# Patient Record
Sex: FEMALE
[De-identification: names, ages, dates, MRNs, and addresses within clinical notes are randomized per-mention and may not be internally consistent; named-entity substitution may affect disease eponyms.]

---

## 2023-06-28 ENCOUNTER — APPOINTMENT (OUTPATIENT)
Dept: UROLOGY | Facility: CLINIC | Age: 33
End: 2023-06-28
Payer: COMMERCIAL

## 2023-06-28 ENCOUNTER — TRANSCRIPTION ENCOUNTER (OUTPATIENT)
Age: 33
End: 2023-06-28

## 2023-06-28 VITALS
HEART RATE: 65 BPM | SYSTOLIC BLOOD PRESSURE: 105 MMHG | BODY MASS INDEX: 24.71 KG/M2 | TEMPERATURE: 97.5 F | WEIGHT: 163 LBS | OXYGEN SATURATION: 98 % | DIASTOLIC BLOOD PRESSURE: 69 MMHG | HEIGHT: 68 IN

## 2023-06-28 DIAGNOSIS — R39.9 UNSPECIFIED SYMPTOMS AND SIGNS INVOLVING THE GENITOURINARY SYSTEM: ICD-10-CM

## 2023-06-28 PROBLEM — Z00.00 ENCOUNTER FOR PREVENTIVE HEALTH EXAMINATION: Status: ACTIVE | Noted: 2023-06-28

## 2023-06-28 LAB
BILIRUB UR QL STRIP: NORMAL
CLARITY UR: CLEAR
COLLECTION METHOD: NORMAL
GLUCOSE UR-MCNC: NORMAL
HCG UR QL: 0.2 EU/DL
HGB UR QL STRIP.AUTO: NORMAL
KETONES UR-MCNC: NORMAL
LEUKOCYTE ESTERASE UR QL STRIP: NORMAL
NITRITE UR QL STRIP: NORMAL
PH UR STRIP: 5.5
PROT UR STRIP-MCNC: NORMAL
SP GR UR STRIP: 1.02

## 2023-06-28 PROCEDURE — 99204 OFFICE O/P NEW MOD 45 MIN: CPT

## 2023-06-28 PROCEDURE — 81003 URINALYSIS AUTO W/O SCOPE: CPT | Mod: QW

## 2023-06-30 LAB
APPEARANCE: ABNORMAL
BACTERIA: ABNORMAL /HPF
BILIRUBIN URINE: NEGATIVE
BLOOD URINE: NEGATIVE
C TRACH RRNA SPEC QL NAA+PROBE: NOT DETECTED
CAST: 1 /LPF
COLOR: YELLOW
EPITHELIAL CELLS: 12 /HPF
GLUCOSE QUALITATIVE U: NEGATIVE MG/DL
KETONES URINE: NEGATIVE MG/DL
LEUKOCYTE ESTERASE URINE: ABNORMAL
MICROSCOPIC-UA: NORMAL
N GONORRHOEA RRNA SPEC QL NAA+PROBE: NOT DETECTED
NITRITE URINE: NEGATIVE
PH URINE: 6
PROTEIN URINE: NEGATIVE MG/DL
RED BLOOD CELLS URINE: 2 /HPF
REVIEW: NORMAL
SOURCE AMPLIFICATION: NORMAL
SPECIFIC GRAVITY URINE: 1.01
UROBILINOGEN URINE: 0.2 MG/DL
WHITE BLOOD CELLS URINE: 29 /HPF

## 2023-07-01 NOTE — ASSESSMENT
[FreeTextEntry1] : \par  33 year old female presents today with UTI symptoms. She was treated ~[facility] weekend for UTI (unsure if culture proven) with single dose of fosfomycin via online service, RX. \par \par Impression/Plan: UTI symptoms \par \par PVR - 242 ml after 2nd void PVR - 17 ml \par \par 1. UA/UCx 6/28/2023 to r/o infection\par 2. STI testing 6/28/2023\par 3. Advised to send recent UCx result to office and was provided with office email\par 4. For dysuria can take OTC Azo\par \par RTC as needed, if symptoms persist or has fever,chills, nausea, vomiting\par \par I, Dr. Marian Lin, personally performed the evaluation and management (E/M) services for this new patient.  That E/M includes conducting the clinically appropriate initial history &/or exam, assessing all conditions, and establishing the plan of care.  Today, my MARELY Sartiha, was here to observe &/or participate in the visit & follow plan of care established by me.\par \par

## 2023-07-01 NOTE — LETTER BODY
[Dear  ___] : Dear  [unfilled], [Courtesy Letter:] : I had the pleasure of seeing your patient, [unfilled], in my office today. [Please see my note below.] : Please see my note below. [Consult Closing:] : Thank you very much for allowing me to participate in the care of this patient.  If you have any questions, please do not hesitate to contact me. [Sincerely,] : Sincerely, [FreeTextEntry3] : Marian Lin MD\par System Director Urogynecology/[facility]\par Department of Urology\par [facility] \par   at [facility]\par  of Urology\par [employer] [facility] [employer] at [facility]\par

## 2023-07-01 NOTE — HISTORY OF PRESENT ILLNESS
[FreeTextEntry1] : CC: UTI\par \par 33 year old female with no significant PMH presents to office for UTI. She was recently treated for a UTI ~ [facility] weekend,  but reports currently having UTI symptoms that began on Sunday June 25 2023 with symptoms of frequency, pain in her lower back and abdomen and feeling bloated. She took Ibuprofen which did not help.\par \par She reports starting a new job ~ April/May 2023 that entails consuming alcohol (she normally does not drink alcohol) which may have precipitated the UTI. She reports around  [place] [place] weekend had UTI with symptoms of dysuria, drops of urine and no hematuria. She sent a urine sample to an online service, Rx, where she was given 1 single dose of fosfomycin. \par \par She reports she has not had a UTI since she was 22 years old where UTI occurred very often with symptoms of strong urge to urinate but no void and dysuria.   \par \par Today 6/28/2023 admits to frequency, pain lower abdomen and lower back, and feeling bloated \par \par Denies STI, any new partners, yeast infection or discharge, hematuria, or constipation\par \par Has Copper IUD in place since  Oct 7 2020\par \par PVR  -242 ml after 2nd void PVR - 17 ml \par

## 2023-07-05 RX ORDER — SULFAMETHOXAZOLE AND TRIMETHOPRIM 800; 160 MG/1; MG/1
800-160 TABLET ORAL
Qty: 10 | Refills: 0 | Status: ACTIVE | COMMUNITY
Start: 2023-07-05 | End: 1900-01-01

## 2023-07-11 LAB — BACTERIA UR CULT: ABNORMAL
